# Patient Record
Sex: FEMALE | Race: WHITE | NOT HISPANIC OR LATINO | URBAN - METROPOLITAN AREA
[De-identification: names, ages, dates, MRNs, and addresses within clinical notes are randomized per-mention and may not be internally consistent; named-entity substitution may affect disease eponyms.]

---

## 2018-05-16 ENCOUNTER — APPOINTMENT (RX ONLY)
Dept: URBAN - METROPOLITAN AREA CLINIC 23 | Facility: CLINIC | Age: 43
Setting detail: DERMATOLOGY
End: 2018-05-16

## 2018-05-16 DIAGNOSIS — D22 MELANOCYTIC NEVI: ICD-10-CM

## 2018-05-16 DIAGNOSIS — L20.89 OTHER ATOPIC DERMATITIS: ICD-10-CM

## 2018-05-16 DIAGNOSIS — L81.4 OTHER MELANIN HYPERPIGMENTATION: ICD-10-CM

## 2018-05-16 PROBLEM — E03.9 HYPOTHYROIDISM, UNSPECIFIED: Status: ACTIVE | Noted: 2018-05-16

## 2018-05-16 PROBLEM — L20.84 INTRINSIC (ALLERGIC) ECZEMA: Status: ACTIVE | Noted: 2018-05-16

## 2018-05-16 PROBLEM — D22.5 MELANOCYTIC NEVI OF TRUNK: Status: ACTIVE | Noted: 2018-05-16

## 2018-05-16 PROBLEM — L85.3 XEROSIS CUTIS: Status: ACTIVE | Noted: 2018-05-16

## 2018-05-16 PROCEDURE — 99214 OFFICE O/P EST MOD 30 MIN: CPT | Mod: 25

## 2018-05-16 PROCEDURE — 11300 SHAVE SKIN LESION 0.5 CM/<: CPT

## 2018-05-16 PROCEDURE — ? PRESCRIPTION

## 2018-05-16 PROCEDURE — ? COUNSELING

## 2018-05-16 PROCEDURE — ? SHAVE REMOVAL

## 2018-05-16 RX ORDER — BETAMETHASONE VALERATE 1.2 MG/G
CREAM TOPICAL
Qty: 2 | Refills: 2 | Status: ACTIVE

## 2018-05-16 ASSESSMENT — LOCATION ZONE DERM
LOCATION ZONE: ARM
LOCATION ZONE: TRUNK
LOCATION ZONE: NECK

## 2018-05-16 ASSESSMENT — LOCATION SIMPLE DESCRIPTION DERM
LOCATION SIMPLE: RIGHT FOREARM
LOCATION SIMPLE: ABDOMEN
LOCATION SIMPLE: CHEST
LOCATION SIMPLE: RIGHT LOWER BACK
LOCATION SIMPLE: LEFT UPPER BACK
LOCATION SIMPLE: RIGHT UPPER BACK
LOCATION SIMPLE: LEFT FOREARM
LOCATION SIMPLE: LEFT LOWER BACK
LOCATION SIMPLE: POSTERIOR NECK

## 2018-05-16 ASSESSMENT — LOCATION DETAILED DESCRIPTION DERM
LOCATION DETAILED: RIGHT DISTAL RADIAL DORSAL FOREARM
LOCATION DETAILED: LEFT DISTAL DORSAL FOREARM
LOCATION DETAILED: RIGHT SUPERIOR LATERAL MIDBACK
LOCATION DETAILED: RIGHT MEDIAL TRAPEZIAL NECK
LOCATION DETAILED: LEFT INFERIOR MEDIAL MIDBACK
LOCATION DETAILED: RIGHT MID-UPPER BACK
LOCATION DETAILED: PERIUMBILICAL SKIN
LOCATION DETAILED: EPIGASTRIC SKIN
LOCATION DETAILED: LEFT SUPERIOR UPPER BACK
LOCATION DETAILED: LEFT LATERAL SUPERIOR CHEST

## 2018-05-16 NOTE — PROCEDURE: SHAVE REMOVAL
Consent was obtained from the patient. The risks and benefits to therapy were discussed in detail. Specifically, the risks of infection, scarring, bleeding, prolonged wound healing, incomplete removal, allergy to anesthesia, nerve injury and recurrence were addressed. Prior to the procedure, the treatment site was clearly identified and confirmed by the patient. All components of Universal Protocol/PAUSE Rule completed.
Hemostasis: Aluminum Chloride
Medical Necessity Clause: This procedure was medically necessary because the lesion that was treated was:
Was A Bandage Applied: Yes
Wound Care: Vaseline
Accession #: [id number]
Billing Type: Third-Party Bill
Path Notes (To The Dermatopathologist): Please check margins.
Anesthesia Type: 1% lidocaine with epinephrine
Anesthesia Volume In Cc: 0.3
Bill [id number] For Specimen Handling/Conveyance To Laboratory?: no
Detail Level: Detailed
Size Of Margin In Cm (Margins Are Not Added To Billing Dimensions): -
Size Of Lesion In Cm (Required): 0.5
Biopsy Method: Dermablade
Medical Necessity Information: It is in your best interest to select a reason for this procedure from the list below. All of these items fulfill various CMS LCD requirements except the new and changing color options.
X Size Of Lesion In Cm (Optional): 0
Post-Care Instructions: I reviewed with the patient in detail post-care instructions. Patient is to keep the biopsy site dry overnight, and then apply Vaseline and bandaid daily until healed. Patient may apply diluted hydrogen peroxide soaks to remove any crusting.
Notification Instructions: Patient will be notified of biopsy results. However, patient instructed to call the office if not contacted within 2 weeks.

## 2018-06-25 ENCOUNTER — APPOINTMENT (RX ONLY)
Dept: URBAN - METROPOLITAN AREA CLINIC 23 | Facility: CLINIC | Age: 43
Setting detail: DERMATOLOGY
End: 2018-06-25

## 2018-06-25 DIAGNOSIS — L82.1 OTHER SEBORRHEIC KERATOSIS: ICD-10-CM

## 2018-06-25 PROCEDURE — ? OTHER

## 2018-06-25 PROCEDURE — 99024 POSTOP FOLLOW-UP VISIT: CPT

## 2018-06-25 NOTE — PROCEDURE: OTHER
Detail Level: Simple
Other (Free Text): After reviewing the pathology report and photo of the original lesion, there were inconsistencies between the two. The patient was advised that Dr Rios would review the pathology slide and call the patient today. Patient was neither prepped or anesthetized for procedure.\\n\\Simon Rios reviewed the slide and tracking sheet and determined that the  had inadvertently typed SCC instead of the correct dx of SK. The lesion was billed correctly as benign seborrheic keratosis. Dr Rios called the patient and explained the error and that our protocol of reconciling clinical photo with pathology prior to commencing procedure had worked as designed. Pt relieved that lesion was benign and NFTN.
Note Text (......Xxx Chief Complaint.): This diagnosis correlates with the

## 2021-07-07 ENCOUNTER — APPOINTMENT (RX ONLY)
Dept: URBAN - METROPOLITAN AREA CLINIC 23 | Facility: CLINIC | Age: 46
Setting detail: DERMATOLOGY
End: 2021-07-07

## 2021-07-07 DIAGNOSIS — D485 NEOPLASM OF UNCERTAIN BEHAVIOR OF SKIN: ICD-10-CM

## 2021-07-07 DIAGNOSIS — D22 MELANOCYTIC NEVI: ICD-10-CM

## 2021-07-07 DIAGNOSIS — L91.8 OTHER HYPERTROPHIC DISORDERS OF THE SKIN: ICD-10-CM

## 2021-07-07 DIAGNOSIS — L82.1 OTHER SEBORRHEIC KERATOSIS: ICD-10-CM

## 2021-07-07 DIAGNOSIS — L81.4 OTHER MELANIN HYPERPIGMENTATION: ICD-10-CM

## 2021-07-07 DIAGNOSIS — L82.0 INFLAMED SEBORRHEIC KERATOSIS: ICD-10-CM

## 2021-07-07 DIAGNOSIS — L57.8 OTHER SKIN CHANGES DUE TO CHRONIC EXPOSURE TO NONIONIZING RADIATION: ICD-10-CM

## 2021-07-07 PROBLEM — L55.1 SUNBURN OF SECOND DEGREE: Status: ACTIVE | Noted: 2021-07-07

## 2021-07-07 PROBLEM — D48.5 NEOPLASM OF UNCERTAIN BEHAVIOR OF SKIN: Status: ACTIVE | Noted: 2021-07-07

## 2021-07-07 PROBLEM — D22.5 MELANOCYTIC NEVI OF TRUNK: Status: ACTIVE | Noted: 2021-07-07

## 2021-07-07 PROBLEM — L85.3 XEROSIS CUTIS: Status: ACTIVE | Noted: 2021-07-07

## 2021-07-07 PROBLEM — E03.9 HYPOTHYROIDISM, UNSPECIFIED: Status: ACTIVE | Noted: 2021-07-07

## 2021-07-07 PROCEDURE — ? LIQUID NITROGEN (COSMETIC)

## 2021-07-07 PROCEDURE — 99203 OFFICE O/P NEW LOW 30 MIN: CPT | Mod: 25

## 2021-07-07 PROCEDURE — ? DEFER

## 2021-07-07 PROCEDURE — 17110 DESTRUCTION B9 LES UP TO 14: CPT

## 2021-07-07 PROCEDURE — ? LIQUID NITROGEN

## 2021-07-07 PROCEDURE — ? COUNSELING

## 2021-07-07 ASSESSMENT — LOCATION DETAILED DESCRIPTION DERM
LOCATION DETAILED: RIGHT CLAVICULAR NECK
LOCATION DETAILED: EPIGASTRIC SKIN
LOCATION DETAILED: LEFT CAVUM CONCHA
LOCATION DETAILED: RIGHT INFERIOR LATERAL NECK
LOCATION DETAILED: RIGHT INFERIOR UPPER BACK
LOCATION DETAILED: RIGHT PROXIMAL RADIAL DORSAL FOREARM
LOCATION DETAILED: LEFT PROXIMAL DORSAL FOREARM
LOCATION DETAILED: LEFT INFERIOR LATERAL NECK
LOCATION DETAILED: RIGHT SUPERIOR UPPER BACK
LOCATION DETAILED: RIGHT POPLITEAL SKIN
LOCATION DETAILED: LEFT ANTERIOR PROXIMAL THIGH
LOCATION DETAILED: RIGHT DISTAL PRETIBIAL REGION
LOCATION DETAILED: PERIUMBILICAL SKIN
LOCATION DETAILED: RIGHT RIB CAGE
LOCATION DETAILED: LEFT ANTERIOR SHOULDER

## 2021-07-07 ASSESSMENT — LOCATION SIMPLE DESCRIPTION DERM
LOCATION SIMPLE: RIGHT PRETIBIAL REGION
LOCATION SIMPLE: RIGHT FOREARM
LOCATION SIMPLE: RIGHT POPLITEAL SKIN
LOCATION SIMPLE: LEFT SHOULDER
LOCATION SIMPLE: LEFT ANTERIOR NECK
LOCATION SIMPLE: ABDOMEN
LOCATION SIMPLE: LEFT EAR
LOCATION SIMPLE: LEFT FOREARM
LOCATION SIMPLE: RIGHT ANTERIOR NECK
LOCATION SIMPLE: RIGHT UPPER BACK
LOCATION SIMPLE: LEFT THIGH

## 2021-07-07 ASSESSMENT — LOCATION ZONE DERM
LOCATION ZONE: LEG
LOCATION ZONE: TRUNK
LOCATION ZONE: ARM
LOCATION ZONE: EAR
LOCATION ZONE: NECK

## 2021-07-07 NOTE — PROCEDURE: DEFER
Introduction Text (Please End With A Colon): The following procedure was deferred:
Reason To Defer Override: refer to ENT
Detail Level: Detailed

## 2021-07-07 NOTE — PROCEDURE: LIQUID NITROGEN (COSMETIC)
Consent: The patient's consent was obtained including but not limited to risks of crusting, scabbing, blistering, scarring, darker or lighter pigmentary change, recurrence, incomplete removal and infection. The patient understands that the procedure is cosmetic in nature and is not covered by insurance.
Billing Information: Bill by Static Price
Render Post-Care Instructions In Note?: no
Post-Care Instructions: I reviewed with the patient in detail post-care instructions. Patient is to wear sunprotection, and avoid picking at any of the treated lesions. Pt may apply Vaseline to crusted or scabbing areas.
Detail Level: Detailed

## 2021-07-07 NOTE — PROCEDURE: LIQUID NITROGEN
Add 52 Modifier (Optional): no
Show Topical Anesthesia Variable?: Yes
Medical Necessity Information: It is in your best interest to select a reason for this procedure from the list below. All of these items fulfill various CMS LCD requirements except the new and changing color options.
Post-Care Instructions: I reviewed with the patient in detail post-care instructions. Patient is to wear sunprotection, and avoid picking at any of the treated lesions. Pt may apply Vaseline to crusted or scabbing areas.
Medical Necessity Clause: This procedure was medically necessary because the lesions that were treated were:irritated
Detail Level: Detailed
Consent: The patient's consent was obtained including but not limited to risks of crusting, scabbing, blistering, scarring, darker or lighter pigmentary change, recurrence, incomplete removal and infection.

## 2025-02-17 ENCOUNTER — OFFICE VISIT (OUTPATIENT)
Age: 50
End: 2025-02-17
Payer: COMMERCIAL

## 2025-02-17 VITALS — BODY MASS INDEX: 19.63 KG/M2 | WEIGHT: 115 LBS | HEIGHT: 64 IN

## 2025-02-17 DIAGNOSIS — G56.03 CARPAL TUNNEL SYNDROME, BILATERAL: Primary | ICD-10-CM

## 2025-02-17 PROCEDURE — 99204 OFFICE O/P NEW MOD 45 MIN: CPT | Performed by: ORTHOPAEDIC SURGERY

## 2025-02-17 NOTE — PROGRESS NOTES
Orthopaedic Hand Surgery Note    Name: Lucretia Cota  YOB: 1975  Gender: female  MRN: [id number]    CC: hand numbness      HPI: Patient is a 49 y.o. female with a chief complaint of bilateral hand numbness and tingling in the median nerve distribution, right worse than left. The symptoms have been going on for years. The patient does complain of night wakening and increased symptoms with driving. Evaluation to date has included nerve conduction study. Treatment to date has included braces.     ROS/Meds/PSH/PMH/FH/SH: I personally reviewed the patients standard intake form.  Pertinents are discussed In the HPI    Physical Examination:    Musculoskeletal:       Examination of the bilateral upper extremity demonstrates Decreased sensation to light touch in the median distribution, normal sensation in ulnar and radial distribution, Positive carpal tunnel compression testing and Phalen testing, cap refill < 5 seconds in all fingers. Inspection reveals no thenar atrophy. Negative Tinel and elbow flexion compression test of the cubital tunnel, negative Tinel over Guyon's canal. Sensation to light touch in the ulnar 2 digits is normal with no intrinsic atrophy/weakness. No tenderness to palpation or masses noted in the forearm. There is tenderness at the right thumb CMC, CMC grind is positive for pain    Imaging / Electrodiagnostic Tests:     I independently reviewed and interpreted the patient's nerve conduction study.  She has findings consistent with moderate bilateral carpal tunnel syndrome, worse on the right than the left.  There is no evidence of cubital tunnel syndrome or cervical radiculopathy.    Assessment:     ICD-10-CM    1. Carpal tunnel syndrome, bilateral  G56.03           Plan:  We discussed the diagnosis and different treatment options. We discussed observation, EMG/NCV, night splinting, cortisone injections and surgical decompression and the risks and benefits of all were clearly

## 2025-02-24 ENCOUNTER — TELEPHONE (OUTPATIENT)
Dept: ORTHOPEDIC SURGERY | Age: 50
End: 2025-02-24

## 2025-02-24 NOTE — TELEPHONE ENCOUNTER
Patient has surgery questions and would like to schedule surgery. Patient would like for you to call after 2 tomorrow.

## 2025-02-26 ENCOUNTER — TELEPHONE (OUTPATIENT)
Dept: ORTHOPEDIC SURGERY | Age: 50
End: 2025-02-26

## 2025-02-28 ENCOUNTER — TELEPHONE (OUTPATIENT)
Dept: ORTHOPEDIC SURGERY | Age: 50
End: 2025-02-28

## 2025-02-28 NOTE — TELEPHONE ENCOUNTER
I called and spoke with patient, she would like to tentatively schedule her surgery at [facility] for 4/18/2025.  She wants me to check with [facility] to see if the following dates are available as those would work better for her 7/11, 7/18/7/25.  I advised her I would check and call her back but that it may not be today  when I get back to her.  Patient voiced understanding.

## 2025-03-03 ENCOUNTER — TELEPHONE (OUTPATIENT)
Dept: ORTHOPEDIC SURGERY | Age: 50
End: 2025-03-03

## 2025-03-07 DIAGNOSIS — G56.03 CARPAL TUNNEL SYNDROME, BILATERAL: Primary | ICD-10-CM

## 2025-03-10 ENCOUNTER — TELEPHONE (OUTPATIENT)
Dept: ORTHOPEDIC SURGERY | Age: 50
End: 2025-03-10

## 2025-03-10 NOTE — TELEPHONE ENCOUNTER
I called and spoke with patient.  I confirmed that her surgery is scheduled for 4/18/2025 and that I would get that fixed in the computer.  Patient voiced understanding.

## 2025-04-04 ENCOUNTER — TELEPHONE (OUTPATIENT)
Dept: ORTHOPEDIC SURGERY | Age: 50
End: 2025-04-04

## 2025-04-04 NOTE — TELEPHONE ENCOUNTER
I spoke with Ms. Beata and gave her the sx and post-op information. I answered her questions.  She voiced understanding.

## 2025-04-18 ENCOUNTER — TELEPHONE (OUTPATIENT)
Dept: ORTHOPEDIC SURGERY | Age: 50
End: 2025-04-18

## 2025-04-18 ENCOUNTER — OUTSIDE SERVICES (OUTPATIENT)
Age: 50
End: 2025-04-18

## 2025-04-18 DIAGNOSIS — G56.03 CARPAL TUNNEL SYNDROME, BILATERAL: Primary | ICD-10-CM

## 2025-04-18 RX ORDER — ACETAMINOPHEN AND CODEINE PHOSPHATE 300; 30 MG/1; MG/1
1 TABLET ORAL EVERY 4 HOURS PRN
Qty: 12 TABLET | Refills: 0 | Status: SHIPPED | OUTPATIENT
Start: 2025-04-18 | End: 2025-04-18 | Stop reason: RX

## 2025-04-18 RX ORDER — ACETAMINOPHEN AND CODEINE PHOSPHATE 300; 30 MG/1; MG/1
1 TABLET ORAL EVERY 4 HOURS PRN
Qty: 12 TABLET | Refills: 0 | Status: SHIPPED | OUTPATIENT
Start: 2025-04-18 | End: 2025-04-21

## 2025-04-18 NOTE — OP NOTE
Hand Surgery Operative Note      Lucretia Cota   49 y.o.   female      Pre-op diagnosis: Cannot find OR record Carpal Tunnel syndrome  Post op diagnosis: same      Procedure: Cannot find OR record Ultrasound-Guided Carpal Tunnel release cpt 75334, [id number]     Surgeon: Marian Hernandez MD     Anesthesia: Local + MAC      Tourniquet time: * No surgery found *      Procedure indications: Patient with radial digit numbness recalcitrant to conservative measures with positive documentation of NCV findings consistent with carpal tunnel syndrome. After Thorough discussion, the patient decided to proceed with surgical management. We discussed in detail surgical risks including scar, pain, bleeding, infection, anesthetic risks, neurovascular injury, need for further surgery,  weakness, stiffness, risk of death and potential risk of other unforseen complication.      Procedure description:      Patient was placed in the supine position and after appropriate time-out and side, site and procedure confirmed.     First I addressed the right side. Using a sterile ultrasound cover and sterile gel, relevant anatomical landmarks were identified, including but not limited to the median nerve, thenar motor branch/recurrent motor branch of the median nerve, palmar cutaneous branch of the median nerve, median and ulnar digital nerves and any visualized communications, ulnar vessels and superficial palmar arch, palmar fascia and distal transverse carpal ligament. A sterile ink marker was used to lorrie the borders of the transverse and longitudinal safe zones as well as the incision site in the proximal carpal tunnel region. The safe zones were re-scanned to ensure acceptable anatomy and sonographic visualization.    A regional anesthetic was administered. A 25 gauge] needle was used to create a small skin wheal at the incision site using 2 ml of buffered 1% lidocaine plain. Following this, under the guidance of ultrasound local anesthesia was

## 2025-04-18 NOTE — TELEPHONE ENCOUNTER
I'm sending this to  Friend to send to [facility].    Requested Prescriptions     Pending Prescriptions Disp Refills    acetaminophen-codeine (TYLENOL #3) 300-30 MG per tablet 12 tablet 0     Sig: Take 1 tablet by mouth every 4 hours as needed for Pain for up to 3 days. Max Daily Amount: 6 tablets

## 2025-04-18 NOTE — TELEPHONE ENCOUNTER
I called and spoke with patient, advised her I sent the Rx to Dr. Hernandez to sign.  Also advised she can start taking it whenever she would like.  Patient voiced understanding.

## 2025-04-18 NOTE — TELEPHONE ENCOUNTER
Can you call the Tylenol #3 in to [facility]. He didn't have many so she said please send quickly. She wants to know what time to take it also.

## 2025-04-18 NOTE — TELEPHONE ENCOUNTER
They received a RX for Tylenol #3. They do not carry that. She Is deleting it out of their system. She has also left the patient a message.

## 2025-04-18 NOTE — TELEPHONE ENCOUNTER
I called and left a  for patient stating we got the message from her pharmacy that they do not carry tylenol #3.  I advised her she will need to call around and find a pharmacy that does carry it.  Once she's done that if she will please call us and let us know which pharmacy she wants the rx resent to we will be happy to resend it.  Also advised her to please call us back with any questions.

## 2025-04-28 ENCOUNTER — OFFICE VISIT (OUTPATIENT)
Age: 50
End: 2025-04-28

## 2025-04-28 DIAGNOSIS — G56.03 CARPAL TUNNEL SYNDROME, BILATERAL: Primary | ICD-10-CM

## 2025-04-28 PROCEDURE — 99024 POSTOP FOLLOW-UP VISIT: CPT | Performed by: ORTHOPAEDIC SURGERY

## 2025-04-28 NOTE — PROGRESS NOTES
Orthopaedic Hand Surgery Note    Name: Lucretia Cota  YOB: 1975  Gender: female  MRN: [id number]    HPI: Patient is status post No surgery found on No surgery found. Patient reports sensation is improved.  No fevers or chills.    Physical Examination:  Wound healing well. Good finger and wrist range of motion. Sensation is improved from preoperative.    Assessment:   1. Carpal tunnel syndrome, bilateral        Status post No surgery found on No surgery found    Plan:  We discussed the patient can perform range of motion and strengthening exercises as tolerated. I have no activity restrictions at this time. Patient will follow up as needed    Marian Hernandez MD  Orthopaedic Surgery  04/28/25  3:34 PM